# Patient Record
Sex: MALE | URBAN - METROPOLITAN AREA
[De-identification: names, ages, dates, MRNs, and addresses within clinical notes are randomized per-mention and may not be internally consistent; named-entity substitution may affect disease eponyms.]

---

## 2020-04-01 ENCOUNTER — OUTPATIENT (OUTPATIENT)
Dept: OUTPATIENT SERVICES | Facility: HOSPITAL | Age: 30
LOS: 1 days | End: 2020-04-01
Payer: MEDICAID

## 2020-04-01 PROCEDURE — G9001: CPT

## 2020-04-15 DIAGNOSIS — Z71.89 OTHER SPECIFIED COUNSELING: ICD-10-CM

## 2022-03-29 ENCOUNTER — EMERGENCY (EMERGENCY)
Facility: HOSPITAL | Age: 32
LOS: 1 days | Discharge: HOME | End: 2022-03-29
Attending: STUDENT IN AN ORGANIZED HEALTH CARE EDUCATION/TRAINING PROGRAM | Admitting: STUDENT IN AN ORGANIZED HEALTH CARE EDUCATION/TRAINING PROGRAM
Payer: MEDICAID

## 2022-03-29 VITALS
HEART RATE: 51 BPM | SYSTOLIC BLOOD PRESSURE: 127 MMHG | DIASTOLIC BLOOD PRESSURE: 67 MMHG | OXYGEN SATURATION: 100 % | RESPIRATION RATE: 17 BRPM | TEMPERATURE: 97 F

## 2022-03-29 PROCEDURE — 99282 EMERGENCY DEPT VISIT SF MDM: CPT

## 2022-03-29 RX ORDER — OXYCODONE HYDROCHLORIDE 5 MG/1
1 TABLET ORAL
Qty: 9 | Refills: 0
Start: 2022-03-29 | End: 2022-03-31

## 2022-03-29 NOTE — ED ADULT TRIAGE NOTE - CHIEF COMPLAINT QUOTE
Pt c/o right lower tooth pain. Pt has appointment with oral surgeon April 19 but can not tolerate pain.

## 2022-03-29 NOTE — ED PROVIDER NOTE - NS ED ROS FT
GENERAL: No fever or chills, //             EYES: no change in vision, //             HEENT: tooth pain //             CARDIAC: no chest pain, //              PULMONARY: no cough or SOB, //             GI: no abdominal pain, no nausea or no vomiting, no diarrhea or constipation, //             : No changes in urination,  //            SKIN: no rashes,  //            NEURO: no headache,  //             MSK: No joint pain otherwise as HPI or negative. ~Martin Zapata, DO

## 2022-03-29 NOTE — ED PROVIDER NOTE - PHYSICAL EXAMINATION
General: well appearing, interactive, well nourished, no apparent distress, ncat  HEENT: EOMI, PERRLA, normal mucosa, normal oropharynx, no lesions on the lips or on oral mucosa, normal external ear, poor dentition, several missing teeth, no purulence noted, no tongue elevation, uvula ml  Neck: supple, no lymphadenopathy, full range of motion, no nuchal rigidity  CV: well perfused   Resp: non labored breathing  MSK: full range of motion, no cyanosis, no edema, no clubbing, no immobility  Neuro: CN II-XII grossly intact, muscle strength 5/5 in all extremities, normal gait  Skin: no rashes, skin intact

## 2022-03-29 NOTE — ED PROVIDER NOTE - PATIENT PORTAL LINK FT
You can access the FollowMyHealth Patient Portal offered by [facility] by registering at the following website: [email or web address]. By joining [facility]’s FollowMyHealth portal, you will also be able to view your health information using other applications (apps) compatible with our system.

## 2022-03-29 NOTE — ED ADULT TRIAGE NOTE - ARRIVAL FROM
Home This is a 38.3 week GA babygirl born via  to a 34 year old  mother. Mom is O+, labs negative, GBS negative 4/6), COVID negative highest maternal temp 36.9 degC, EOS 0.09. No significant maternal history. SROM 0820 clear, then Thick Meconium stained fluid, cat II tracings. Baby born with good tone, crying, transferred to preheated warmer, dried, deep suctioned and stimulated, APGAR 8 and 9 for color.   Admitted to NICU on DOL 1 for bilious emesis. This is a 38.3 week GA babygirl born via  to a 34 year old  mother. Mom is O+, labs negative, GBS negative (4/6), COVID negative highest maternal temp 36.9 degC, EOS 0.09. No significant maternal history. SROM 0820 clear, then Thick Meconium stained fluid, cat II tracings. Baby born with good tone, crying, transferred to preheated warmer, dried, deep suctioned and stimulated, APGAR 8 and 9 for color.   Admitted to NICU on DOL 1 for bilious emesis.      CONTRERAS ARRIOLA; First Name: ______      GA 36 weeks;     Age:1d;   PMA: _____   BW:  3525   MRN: [id number]    COURSE: FT, mec exposed, bilious emesis      INTERVAL EVENTS:     Weight (g): 3525   ( ___ )                               Intake (ml/kg/day):   Urine output (ml/kg/hr or frequency):                                  Stools (frequency):  Other:     Growth:    HC (cm):            [04-16]  Length (cm):  50; [place] weight %  ____ ; ADWG (g/day)  _____ .  *******************************************************    Respiratory: Comfortable in RA.  CV: No current issues. Continue cardiorespiratory monitoring.  Heme: At risk for hyperbilirubinemia. Monitor bilirubin levels.   FEN: NPO, upper GI now,  If normal can restart ad kisha feeds.   ID: Low risk for sepsis.  Monitor clinically  Neuro: Normal exam for GA.   Social:   Possible d/c tomorrow if tolerating feeds     Labs/Imaging/Studies:  am B

## 2022-03-29 NOTE — ED PROVIDER NOTE - NSFOLLOWUPINSTRUCTIONS_ED_ALL_ED_FT
1) Please follow up with your Primary Care Provider in 24-48 hours  2) Seek immediate medical care for any new or returning symptoms including but not limited severe pain, fevers, inability to open your mouth  3) Take Ibuprofen 400-600 mg every 4-6 hours as needed for pain. Do not take more than 1200 mg within a 24 hour period. Take this medication with food  4) Take Tylenol 650 mg every 4-6 hours as needed for pain. Do not take more than 2 grams within a 24 hour period  5) Take Oxycodone 5 mg up to every 8 hours as needed for breakthrough pain. Do not drive or make critical decisions when taking this medication.

## 2022-03-29 NOTE — ED PROVIDER NOTE - CLINICAL SUMMARY MEDICAL DECISION MAKING FREE TEXT BOX
Martin Zapata, DO: 33 yo m no reported pmh, pw dental pain. reports r posterior molar region pain, chronic, worse in last month. has been unable to obtain outpatient fu, scheduled for april. reports is to have several teeth removed. denies trismus, f/c, cp, sob, cough, congestion. reports sensitivity to hot/cold foods. reports decreased po intake to food.  arrives hds, well appearing. pt has pcp fu tomorrow. will give course of narcotics to manage pain temporarily. pt declined our dental resources. declines meds in ed. given strict return precautions. no s/s infectious etiology. dc.

## 2022-03-29 NOTE — ED PROVIDER NOTE - OBJECTIVE STATEMENT
31 yo m no reported pmh, pw dental pain. reports r posterior molar region pain, chronic, worse in last month. has been unable to obtain outpatient fu, scheduled for april. reports is to have several teeth removed. denies trismus, f/c, cp, sob, cough, congestion. reports sensitivity to hot/cold foods. reports decreased po intake to food.